# Patient Record
Sex: MALE | Race: WHITE
[De-identification: names, ages, dates, MRNs, and addresses within clinical notes are randomized per-mention and may not be internally consistent; named-entity substitution may affect disease eponyms.]

---

## 2017-07-05 ENCOUNTER — APPOINTMENT (OUTPATIENT)
Dept: ORTHOPEDIC SURGERY | Facility: CLINIC | Age: 47
End: 2017-07-05

## 2017-07-05 VITALS — HEIGHT: 70 IN | BODY MASS INDEX: 26.48 KG/M2 | WEIGHT: 185 LBS | RESPIRATION RATE: 16 BRPM

## 2017-07-05 DIAGNOSIS — Z78.9 OTHER SPECIFIED HEALTH STATUS: ICD-10-CM

## 2017-07-05 DIAGNOSIS — S62.001A UNSPECIFIED FRACTURE OF NAVICULAR [SCAPHOID] BONE OF RIGHT WRIST, INITIAL ENCOUNTER FOR CLOSED FRACTURE: ICD-10-CM

## 2017-07-05 DIAGNOSIS — S63.8X1D SPRAIN OF OTHER PART OF RIGHT WRIST AND HAND, SUBSEQUENT ENCOUNTER: ICD-10-CM

## 2017-07-05 DIAGNOSIS — S52.571D OTHER INTRAARTICULAR FRACTURE OF LOWER END OF RIGHT RADIUS, SUBSEQUENT ENCOUNTER FOR CLOSED FRACTURE WITH ROUTINE HEALING: ICD-10-CM

## 2020-05-09 ENCOUNTER — TRANSCRIPTION ENCOUNTER (OUTPATIENT)
Age: 50
End: 2020-05-09

## 2021-06-01 ENCOUNTER — NON-APPOINTMENT (OUTPATIENT)
Age: 51
End: 2021-06-01

## 2021-06-01 ENCOUNTER — APPOINTMENT (OUTPATIENT)
Dept: CARDIOLOGY | Facility: CLINIC | Age: 51
End: 2021-06-01
Payer: COMMERCIAL

## 2021-06-01 VITALS — HEART RATE: 60 BPM | DIASTOLIC BLOOD PRESSURE: 82 MMHG | SYSTOLIC BLOOD PRESSURE: 134 MMHG

## 2021-06-01 VITALS
BODY MASS INDEX: 25.77 KG/M2 | DIASTOLIC BLOOD PRESSURE: 80 MMHG | SYSTOLIC BLOOD PRESSURE: 125 MMHG | WEIGHT: 180 LBS | HEIGHT: 70 IN

## 2021-06-01 DIAGNOSIS — Z71.89 OTHER SPECIFIED COUNSELING: ICD-10-CM

## 2021-06-01 DIAGNOSIS — R06.02 SHORTNESS OF BREATH: ICD-10-CM

## 2021-06-01 PROCEDURE — 93000 ELECTROCARDIOGRAM COMPLETE: CPT

## 2021-06-01 PROCEDURE — 99072 ADDL SUPL MATRL&STAF TM PHE: CPT

## 2021-06-01 PROCEDURE — 99203 OFFICE O/P NEW LOW 30 MIN: CPT

## 2021-06-01 NOTE — DISCUSSION/SUMMARY
[FreeTextEntry1] : Cardiac risk counseling\par Feels well without complaints\par No family hx\par BMP 25.8, recommend continued exercise, 5-10 lb weight loss\par Continue to encourage healthy lifestyle, no smoking, diet, exercise\par BP well controlled\par He reports normal lipids from a [facility] clinic a few weeks ago\par \par Will set up for echo, stress

## 2021-06-01 NOTE — HISTORY OF PRESENT ILLNESS
[FreeTextEntry1] : 50M who presents for cardiac evaluation\par \par Feels well, was cooped up over COVID, now trying to resume cycling and running but wants to ensure his heart is OK. Patient denies chest pain, shortness of breath, palpitations, dizziness, lightheadedness, syncope. No known hx of COVID, got vaccinated. \par \par Nonsmoker. Denies family hx of CAD. 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 \par \par ECG: SR, no ST-T wave changes\par \par

## 2021-06-03 ENCOUNTER — APPOINTMENT (OUTPATIENT)
Dept: CARDIOLOGY | Facility: CLINIC | Age: 51
End: 2021-06-03

## 2021-07-08 ENCOUNTER — APPOINTMENT (OUTPATIENT)
Dept: CARDIOLOGY | Facility: CLINIC | Age: 51
End: 2021-07-08

## 2021-07-08 ENCOUNTER — APPOINTMENT (OUTPATIENT)
Dept: INTERNAL MEDICINE | Facility: CLINIC | Age: 51
End: 2021-07-08

## 2021-08-11 ENCOUNTER — APPOINTMENT (OUTPATIENT)
Dept: ORTHOPEDIC SURGERY | Facility: CLINIC | Age: 51
End: 2021-08-11
Payer: COMMERCIAL

## 2021-08-11 VITALS — WEIGHT: 170 LBS | HEIGHT: 70 IN | BODY MASS INDEX: 24.34 KG/M2

## 2021-08-11 DIAGNOSIS — M77.12 LATERAL EPICONDYLITIS, LEFT ELBOW: ICD-10-CM

## 2021-08-11 PROCEDURE — 73080 X-RAY EXAM OF ELBOW: CPT | Mod: LT

## 2021-08-11 PROCEDURE — 99203 OFFICE O/P NEW LOW 30 MIN: CPT

## 2021-08-11 NOTE — PHYSICAL EXAM
[de-identified] : General Exam\par \par Well developed, well nourished\par No apparent distress\par Oriented to person, place, and time\par Mood: Normal\par Affect: Normal\par Balance and coordination: Normal\par Gait: Normal\par \par left elbow exam:\par \par Skin: Clean, dry, intact. No ecchymosis. No swelling. No palpable joint effusion. There is swelling at the lateral condyle increases with wrist and finger extension.\par Tenderness: + tenderness to palpation lateral epicondyle, No medial epicondyle, olecranon, radial head. Pain with resisted wrist ext and supination\par ROM:0-140° full pronation full supination\par Stability: Stable to vaus/valgus stress\par Neuro: Negative tinels at ulnar canal, AIN/PIN/Ulnar nerve in tact to motor/sensation.\par Strength: 5/5 elbow flexion, 5/5 elbow extension, 5/5 supination, 5/5 pronation\par Sensation: In tact to light touch throughout\par Vasc: 2+ radial pulse, <2s cap refill\par  [de-identified] : \par The following radiographs were ordered and read by me during this patients visit. I reviewed each radiograph in detail with the patient and discussed the findings as highlighted below. \par \par 3 views left elbow] were obtained today that show no fracture, dislocation. There is no degenerative change seen. There is no malalignment. No obvious osseous abnormality. Otherwise unremarkable.

## 2021-08-11 NOTE — HISTORY OF PRESENT ILLNESS
[de-identified] :  50 y.o M presents to the office complaining of 6 months+ of L lateral elbow pain. States he was walking a strong dog in February and believes strained his elbow muscles at this time. Pain with golf, and gripping activities. Denies mechanical symptoms. Denies numbness/tingling in involved area. Has not done anything to treat this injury at this time. Here today to discuss treatment options for involved injury. \par \par The patient's past medical history, past surgical history, medications, allergies, and social history were reviewed by me today with the patient and documented accordingly. In addition, the patient's family history, which is noncontributory to this visit, was also reviewed.\par

## 2021-08-11 NOTE — DISCUSSION/SUMMARY
[de-identified] : Left elbow pain for several months after a trauma. There is deformity swelling and pain at the lateral epicondyle. There is pain with resisted wrist and finger extension. There is concern for tear of the common extensor tendon. He has tried rest medications therapy exercises without relief. We will obtain an MRI to further evaluate he will followup after MRI

## 2021-08-28 ENCOUNTER — RESULT REVIEW (OUTPATIENT)
Age: 51
End: 2021-08-28

## 2021-08-28 ENCOUNTER — OUTPATIENT (OUTPATIENT)
Dept: OUTPATIENT SERVICES | Facility: HOSPITAL | Age: 51
LOS: 1 days | End: 2021-08-28
Payer: COMMERCIAL

## 2021-08-28 ENCOUNTER — APPOINTMENT (OUTPATIENT)
Dept: MRI IMAGING | Facility: CLINIC | Age: 51
End: 2021-08-28

## 2021-08-28 DIAGNOSIS — M77.12 LATERAL EPICONDYLITIS, LEFT ELBOW: ICD-10-CM

## 2021-08-28 PROCEDURE — 73221 MRI JOINT UPR EXTREM W/O DYE: CPT

## 2021-08-28 PROCEDURE — 73221 MRI JOINT UPR EXTREM W/O DYE: CPT | Mod: 26,LT

## 2021-09-01 ENCOUNTER — NON-APPOINTMENT (OUTPATIENT)
Age: 51
End: 2021-09-01

## 2022-05-06 ENCOUNTER — APPOINTMENT (OUTPATIENT)
Dept: ORTHOPEDIC SURGERY | Facility: CLINIC | Age: 52
End: 2022-05-06
Payer: COMMERCIAL

## 2022-05-06 VITALS — HEIGHT: 70 IN | BODY MASS INDEX: 25.48 KG/M2 | WEIGHT: 178 LBS

## 2022-05-06 DIAGNOSIS — S93.421A SPRAIN OF DELTOID LIGAMENT OF RIGHT ANKLE, INITIAL ENCOUNTER: ICD-10-CM

## 2022-05-06 DIAGNOSIS — Z00.00 ENCOUNTER FOR GENERAL ADULT MEDICAL EXAMINATION W/OUT ABNORMAL FINDINGS: ICD-10-CM

## 2022-05-06 PROCEDURE — 99214 OFFICE O/P EST MOD 30 MIN: CPT

## 2022-05-06 PROCEDURE — 73610 X-RAY EXAM OF ANKLE: CPT | Mod: RT

## 2022-05-06 PROCEDURE — L4361: CPT

## 2022-05-06 PROCEDURE — 99204 OFFICE O/P NEW MOD 45 MIN: CPT

## 2022-05-06 RX ORDER — MELOXICAM 15 MG/1
15 TABLET ORAL DAILY
Qty: 30 | Refills: 3 | Status: COMPLETED | COMMUNITY
Start: 2022-05-06 | End: 2022-09-03

## 2022-05-06 NOTE — PHYSICAL EXAM
[Mild] : mild diffused ankle swelling [4___] : eversion 4[unfilled]/5 [2+] : posterior tibialis pulse: 2+ [Normal] : saphenous nerve sensation normal [] : non-antalgic [Right] : right ankle [Weight -] : weightbearing [There are no fractures, subluxations or dislocations. No significant abnormalities are seen] : There are no fractures, subluxations or dislocations. No significant abnormalities are seen [de-identified] : plantar flexion 30 degrees [de-identified] : inversion 5 degrees [de-identified] : eversion 5 degrees [TWNoteComboBox7] : dorsiflexion 10 degrees

## 2022-05-06 NOTE — HISTORY OF PRESENT ILLNESS
[7] : 7 [3] : 3 [Dull/Aching] : dull/aching [Localized] : localized [Radiating] : radiating [Leisure] : leisure [Rest] : rest [Ice] : ice [Sitting] : sitting [Standing] : standing [Walking] : walking [Stairs] : stairs [de-identified] : Pt is a 51 year old M who presents today for evaluation of their right ankle. Pt states that he everted his ankle on 5/5/22t and went right to bed, went to [facility] the next day where he was told that the XR showed no evidence of fx. H/O of prior ankle sprains. He reports that rolls on him 2-3 times per year.  He is currently able to wb without assistance. [] : Post Surgical Visit: no [FreeTextEntry1] : R ankle [FreeTextEntry7] : lower leg

## 2022-05-06 NOTE — ASSESSMENT
[FreeTextEntry1] : The possibility of an occult medial malleolus fracture was discussed with the patient.\par  Weight bearing in a cam walker was recommended.  Icing for 20 minutes 2-3 times per day was instructed. Active home range of motion exercises were encouraged.\par \par \par Patient was instructed that they can not operate an automatic vehicle while wearing a CAM boot or cast on the right lower extremity. If operating a vehicle that requires use of a clutch, patient may not drive while wearing a CAM boot or cast on the left lower extremity.\par \par \par The patient was explained the options as well as benefits of over the counter verses prescription strength nonsteroidal anti-inflammatory medication. The patient opts for a prescription strength medication.\par

## 2022-05-19 ENCOUNTER — FORM ENCOUNTER (OUTPATIENT)
Age: 52
End: 2022-05-19

## 2022-05-20 ENCOUNTER — APPOINTMENT (OUTPATIENT)
Dept: ORTHOPEDIC SURGERY | Facility: CLINIC | Age: 52
End: 2022-05-20
Payer: COMMERCIAL

## 2022-05-20 ENCOUNTER — APPOINTMENT (OUTPATIENT)
Dept: MRI IMAGING | Facility: CLINIC | Age: 52
End: 2022-05-20
Payer: COMMERCIAL

## 2022-05-20 DIAGNOSIS — S93.421D SPRAIN OF DELTOID LIGAMENT OF RIGHT ANKLE, SUBSEQUENT ENCOUNTER: ICD-10-CM

## 2022-05-20 PROCEDURE — 73610 X-RAY EXAM OF ANKLE: CPT | Mod: RT

## 2022-05-20 PROCEDURE — 73721 MRI JNT OF LWR EXTRE W/O DYE: CPT | Mod: RT

## 2022-05-20 PROCEDURE — 99213 OFFICE O/P EST LOW 20 MIN: CPT

## 2022-05-20 NOTE — HISTORY OF PRESENT ILLNESS
[Dull/Aching] : dull/aching [Localized] : localized [Radiating] : radiating [Leisure] : leisure [Rest] : rest [Ice] : ice [Sitting] : sitting [Standing] : standing [Walking] : walking [Stairs] : stairs [4] : 4 [2] : 2 [de-identified] : Pt returns for f/u for his right ankle. Pt states that he everted his ankle on 5/5/22.  He states he has been using his cam walker, but does not have it today.  He reports 50% improvement since last visit. He has been icing and using his hot tub. [] : Post Surgical Visit: no [FreeTextEntry1] : R ankle [FreeTextEntry7] : lower leg

## 2022-05-20 NOTE — PHYSICAL EXAM
[Mild] : mild diffused ankle swelling [5___] : dorsiflexion 5[unfilled]/5 [4___] : eversion 4[unfilled]/5 [2+] : posterior tibialis pulse: 2+ [Normal] : saphenous nerve sensation normal [Right] : right ankle [Weight -] : weightbearing [There are no fractures, subluxations or dislocations. No significant abnormalities are seen] : There are no fractures, subluxations or dislocations. No significant abnormalities are seen [] : non-antalgic [de-identified] : plantar flexion 30 degrees [de-identified] : inversion 15 degrees [de-identified] : eversion 10 degrees [TWNoteComboBox7] : dorsiflexion 10 degrees

## 2022-05-20 NOTE — ASSESSMENT
[FreeTextEntry1] : The possibility of an occult medial malleolus fracture was discussed with the patient.\par  Due to the persistent focal tenderness, an mri has been orderd to rule out a medial malleolus fracture. Strict wb in cam walker was discussed.\par \par Pending mri results, further treatment will be indicated.

## 2022-05-25 ENCOUNTER — APPOINTMENT (OUTPATIENT)
Dept: ORTHOPEDIC SURGERY | Facility: CLINIC | Age: 52
End: 2022-05-25
Payer: COMMERCIAL

## 2022-05-25 DIAGNOSIS — T14.8XXA OTHER INJURY OF UNSPECIFIED BODY REGION, INITIAL ENCOUNTER: ICD-10-CM

## 2022-05-25 PROCEDURE — L1902: CPT

## 2022-05-25 PROCEDURE — 99214 OFFICE O/P EST MOD 30 MIN: CPT

## 2022-05-25 NOTE — HISTORY OF PRESENT ILLNESS
[4] : 4 [2] : 2 [Dull/Aching] : dull/aching [Localized] : localized [Radiating] : radiating [Leisure] : leisure [Rest] : rest [Ice] : ice [Sitting] : sitting [Standing] : standing [Walking] : walking [Stairs] : stairs [de-identified] : Pt returns for f/u for his right ankle. Pt states that he everted his ankle on 5/5/22.  WB in cam walker. There is some improvement compared to last visit. He has been icing and using his hot tub. Presents for MRI results.  [] : Post Surgical Visit: no [FreeTextEntry1] : R ankle [FreeTextEntry7] : lower leg

## 2022-05-25 NOTE — DATA REVIEWED
[MRI] : MRI [Right] : of the right [Ankle] : ankle [Report was reviewed and noted in the chart] : The report was reviewed and noted in the chart [I independently reviewed and interpreted images and report] : I independently reviewed and interpreted images and report [FreeTextEntry1] : 1. Findings consistent with acute traumatic injury with areas of stress reaction or contusions in the medial \par malleolus and medial talus without discrete osteochondral lesion involving the talar dome. There are moderate \par syndesmotic sprains, moderate sprains of the anterior talofibular and calcaneofibular ligaments, and moderate \par deltoid sprain with moderate effusion, synovitis and soft tissue swelling without gross malalignment or loose \par body.\par 2. Multifocal long medial flexor and peroneal tenosynovitis.\par 3. Marrow edema at the dorsal second TMT potentially related to mild arthrosis or contusions. Consider MRI of \par the right foot to further evaluate as clinically indicated.

## 2022-05-25 NOTE — PHYSICAL EXAM
[Mild] : mild diffused ankle swelling [5___] : dorsiflexion 5[unfilled]/5 [4___] : eversion 4[unfilled]/5 [2+] : posterior tibialis pulse: 2+ [Normal] : saphenous nerve sensation normal [Right] : right ankle [Weight -] : weightbearing [There are no fractures, subluxations or dislocations. No significant abnormalities are seen] : There are no fractures, subluxations or dislocations. No significant abnormalities are seen [NL (40)] : plantar flexion 40 degrees [NL 30)] : inversion 30 degrees [] : non-antalgic [FreeTextEntry8] : Minimal tenderness.  [de-identified] : plantar flexion 30 degrees [de-identified] : inversion 15 degrees [de-identified] : eversion 15 degrees [TWNoteComboBox7] : dorsiflexion 15 degrees

## 2022-06-29 ENCOUNTER — APPOINTMENT (OUTPATIENT)
Dept: ORTHOPEDIC SURGERY | Facility: CLINIC | Age: 52
End: 2022-06-29

## 2025-06-30 ENCOUNTER — APPOINTMENT (OUTPATIENT)
Dept: GASTROENTEROLOGY | Facility: CLINIC | Age: 55
End: 2025-06-30
Payer: COMMERCIAL

## 2025-06-30 ENCOUNTER — APPOINTMENT (OUTPATIENT)
Dept: GASTROENTEROLOGY | Facility: CLINIC | Age: 55
End: 2025-06-30

## 2025-06-30 ENCOUNTER — NON-APPOINTMENT (OUTPATIENT)
Age: 55
End: 2025-06-30

## 2025-06-30 VITALS
HEART RATE: 68 BPM | HEIGHT: 70 IN | DIASTOLIC BLOOD PRESSURE: 88 MMHG | WEIGHT: 185.05 LBS | BODY MASS INDEX: 26.49 KG/M2 | SYSTOLIC BLOOD PRESSURE: 143 MMHG

## 2025-06-30 PROBLEM — R19.4 CHANGE IN BOWEL HABITS: Status: ACTIVE | Noted: 2025-06-30

## 2025-06-30 PROCEDURE — 99205 OFFICE O/P NEW HI 60 MIN: CPT

## 2025-06-30 PROCEDURE — G2211 COMPLEX E/M VISIT ADD ON: CPT | Mod: NC

## 2025-07-01 ENCOUNTER — NON-APPOINTMENT (OUTPATIENT)
Age: 55
End: 2025-07-01

## 2025-07-01 PROBLEM — R79.89 ELEVATED LFTS: Status: ACTIVE | Noted: 2025-07-01

## 2025-07-01 LAB
ALBUMIN SERPL ELPH-MCNC: 4.4 G/DL
ALP BLD-CCNC: 207 U/L
ALT SERPL-CCNC: 124 U/L
ANION GAP SERPL CALC-SCNC: 15 MMOL/L
AST SERPL-CCNC: 65 U/L
BILIRUB SERPL-MCNC: 1.1 MG/DL
BUN SERPL-MCNC: 15 MG/DL
CALCIUM SERPL-MCNC: 9.9 MG/DL
CERULOPLASMIN SERPL-MCNC: 33 MG/DL
CHLORIDE SERPL-SCNC: 102 MMOL/L
CO2 SERPL-SCNC: 20 MMOL/L
CREAT SERPL-MCNC: 0.97 MG/DL
EGFRCR SERPLBLD CKD-EPI 2021: 92 ML/MIN/1.73M2
FERRITIN SERPL-MCNC: 690 NG/ML
GLUCOSE SERPL-MCNC: 100 MG/DL
HAV IGM SER QL: NONREACTIVE
HBV CORE IGG+IGM SER QL: NONREACTIVE
HBV CORE IGM SER QL: NONREACTIVE
HBV SURFACE AB SER QL: NONREACTIVE
HBV SURFACE AG SER QL: NONREACTIVE
HCT VFR BLD CALC: 44.1 %
HCV AB SER QL: NONREACTIVE
HCV S/CO RATIO: 0.14 S/CO
HGB BLD-MCNC: 15.1 G/DL
INR PPP: 0.95 RATIO
IRON SATN MFR SERPL: 11 %
IRON SERPL-MCNC: 31 UG/DL
LPL SERPL-CCNC: 42 U/L
MCHC RBC-ENTMCNC: 32.3 PG
MCHC RBC-ENTMCNC: 34.2 G/DL
MCV RBC AUTO: 94.2 FL
PLATELET # BLD AUTO: 278 K/UL
POTASSIUM SERPL-SCNC: 4.5 MMOL/L
PROT SERPL-MCNC: 7.8 G/DL
PT BLD: 11.2 SEC
RBC # BLD: 4.68 M/UL
RBC # FLD: 12.2 %
SODIUM SERPL-SCNC: 138 MMOL/L
TIBC SERPL-MCNC: 270 UG/DL
TSH SERPL-ACNC: 3.02 UIU/ML
UIBC SERPL-MCNC: 240 UG/DL
WBC # FLD AUTO: 8.15 K/UL

## 2025-07-02 ENCOUNTER — LABORATORY RESULT (OUTPATIENT)
Age: 55
End: 2025-07-02

## 2025-07-02 ENCOUNTER — APPOINTMENT (OUTPATIENT)
Dept: GASTROENTEROLOGY | Facility: CLINIC | Age: 55
End: 2025-07-02
Payer: COMMERCIAL

## 2025-07-02 PROBLEM — K57.92 ACUTE DIVERTICULITIS: Status: ACTIVE | Noted: 2025-07-02

## 2025-07-02 LAB — ANA TITR SER: NEGATIVE

## 2025-07-02 PROCEDURE — 45380 COLONOSCOPY AND BIOPSY: CPT

## 2025-07-10 PROBLEM — Z86.0100 PERSONAL HISTORY OF COLON POLYPS, UNSPECIFIED: Status: ACTIVE | Noted: 2025-07-02

## 2025-07-21 ENCOUNTER — TRANSCRIPTION ENCOUNTER (OUTPATIENT)
Age: 55
End: 2025-07-21

## 2025-07-21 ENCOUNTER — OUTPATIENT (OUTPATIENT)
Dept: OUTPATIENT SERVICES | Facility: HOSPITAL | Age: 55
LOS: 1 days | End: 2025-07-21
Payer: COMMERCIAL

## 2025-07-21 ENCOUNTER — APPOINTMENT (OUTPATIENT)
Dept: ULTRASOUND IMAGING | Facility: CLINIC | Age: 55
End: 2025-07-21
Payer: COMMERCIAL

## 2025-07-21 DIAGNOSIS — R79.89 OTHER SPECIFIED ABNORMAL FINDINGS OF BLOOD CHEMISTRY: ICD-10-CM

## 2025-07-21 PROCEDURE — 76700 US EXAM ABDOM COMPLETE: CPT

## 2025-07-21 PROCEDURE — 76700 US EXAM ABDOM COMPLETE: CPT | Mod: 26

## 2025-07-24 ENCOUNTER — NON-APPOINTMENT (OUTPATIENT)
Age: 55
End: 2025-07-24

## 2025-08-28 ENCOUNTER — NON-APPOINTMENT (OUTPATIENT)
Age: 55
End: 2025-08-28